# Patient Record
Sex: FEMALE | Race: BLACK OR AFRICAN AMERICAN | NOT HISPANIC OR LATINO | Employment: FULL TIME
[De-identification: names, ages, dates, MRNs, and addresses within clinical notes are randomized per-mention and may not be internally consistent; named-entity substitution may affect disease eponyms.]

---

## 2019-06-06 ENCOUNTER — RECORDS - HEALTHEAST (OUTPATIENT)
Dept: ADMINISTRATIVE | Facility: OTHER | Age: 29
End: 2019-06-06

## 2019-10-25 ENCOUNTER — OFFICE VISIT (OUTPATIENT)
Dept: URGENT CARE | Facility: URGENT CARE | Age: 29
End: 2019-10-25
Payer: COMMERCIAL

## 2019-10-25 VITALS
DIASTOLIC BLOOD PRESSURE: 70 MMHG | BODY MASS INDEX: 30.11 KG/M2 | SYSTOLIC BLOOD PRESSURE: 104 MMHG | HEART RATE: 80 BPM | WEIGHT: 176.37 LBS | HEIGHT: 64 IN | TEMPERATURE: 98 F

## 2019-10-25 DIAGNOSIS — B36.9 FUNGAL DERMATITIS: Primary | ICD-10-CM

## 2019-10-25 PROCEDURE — 99203 OFFICE O/P NEW LOW 30 MIN: CPT | Performed by: FAMILY MEDICINE

## 2019-10-25 RX ORDER — KETOCONAZOLE 20 MG/G
CREAM TOPICAL DAILY
Qty: 60 G | Refills: 1 | Status: SHIPPED | OUTPATIENT
Start: 2019-10-25 | End: 2023-10-27

## 2019-10-25 ASSESSMENT — MIFFLIN-ST. JEOR: SCORE: 1500

## 2019-10-26 NOTE — PROGRESS NOTES
Subjective: Patient has had a rash off and on for a month or so and apparently some family members have it as well.  It is itchy.    Objective: There are multiple round lesions on her face and arms with rough skin consistent with fungus.    Assessment and plan: Fungal dermatitis.  Will treat with Nizoral cream, needs to be diligent as this could take 3 or 4 weeks.    Addendum: She has been having a problem for a long time with burning in her eyes.  I thought may be a dermatologist could help sort out whether these are connected with her present rash and I referred her.

## 2020-03-08 ENCOUNTER — OFFICE VISIT (OUTPATIENT)
Dept: URGENT CARE | Facility: URGENT CARE | Age: 30
End: 2020-03-08
Payer: COMMERCIAL

## 2020-03-08 VITALS
SYSTOLIC BLOOD PRESSURE: 104 MMHG | OXYGEN SATURATION: 100 % | TEMPERATURE: 98.6 F | WEIGHT: 181 LBS | DIASTOLIC BLOOD PRESSURE: 74 MMHG | HEART RATE: 88 BPM | BODY MASS INDEX: 31.07 KG/M2

## 2020-03-08 DIAGNOSIS — J06.9 UPPER RESPIRATORY TRACT INFECTION, UNSPECIFIED TYPE: Primary | ICD-10-CM

## 2020-03-08 DIAGNOSIS — R07.0 THROAT PAIN: ICD-10-CM

## 2020-03-08 PROCEDURE — 87651 STREP A DNA AMP PROBE: CPT | Performed by: PHYSICIAN ASSISTANT

## 2020-03-08 PROCEDURE — 40001204 ZZHCL STATISTIC STREP A RAPID: Performed by: PHYSICIAN ASSISTANT

## 2020-03-08 PROCEDURE — 99213 OFFICE O/P EST LOW 20 MIN: CPT | Performed by: FAMILY MEDICINE

## 2020-03-08 NOTE — PATIENT INSTRUCTIONS
Patient Education     When You Have a Sore Throat    A sore throat can be painful. There are many reasons why you may have a sore throat. Your healthcare provider will work with you to find the cause of your sore throat. He or she will also find the best treatment for you.  What causes a sore throat?  Sore throats can be caused or worsened by:    Cold or flu viruses    Bacteria    Irritants such as tobacco smoke or air pollution    Acid reflux  A healthy throat  The tonsils are on the sides of the throat near the base of the tongue. They collect viruses and bacteria and help fight infection. The throat (pharynx) is the passage for air. Mucus from the nasal cavity also moves down the passage.  An inflamed throat  The tonsils and pharynx can become inflamed due to a cold or flu virus. Postnasal drip (excess mucus draining from the nasal cavity) can irritate the throat. It can also make the throat or tonsils more likely to be infected by bacteria. Severe, untreated tonsillitis in children or adults can cause a pocket of pus (abscess) to form near the tonsil.  Your evaluation  A medical evaluation can help find the cause of your sore throat. It can also help your healthcare provider choose the best treatment for you. The evaluation may include a health history, physical exam, and diagnostic tests.  Health history  Your healthcare provider may ask you the following:    How long has the sore throat lasted and how have you been treating it?    Do you have any other symptoms, such as body aches, fever, or cough?    Does your sore throat recur? If so, how often? How many days of school or work have you missed because of a sore throat?    Do you have trouble eating or swallowing?    Have you been told that you snore or have other sleep problems?    Do you have bad breath?    Do you cough up bad-tasting mucus?  Physical exam  During the exam, your healthcare provider checks your ears, nose, and throat for problems. He or she  "also checks for swelling in the neck, and may listen to your chest.  Possible tests  Other tests your healthcare provider may perform include:    A throat swab to check for bacteria such as streptococcus (the bacteria that causes strep throat)    A blood test to check for mononucleosis (a viral infection)    A chest X-ray to rule out pneumonia, especially if you have a cough  Treating a sore throat  Treatment depends on many factors. What is the likely cause? Is the problem recent? Does it keep coming back? In many cases, the best thing to do is to treat the symptoms, rest, and let the problem heal itself. Antibiotics may help clear up some bacterial infections. For cases of severe or recurring tonsillitis, the tonsils may need to be removed.  Relieving your symptoms    Don t smoke, and avoid secondhand smoke.    For children, try throat sprays or Popsicles. Adults and older children may try lozenges.    Drink warm liquids to soothe the throat and help thin mucus. Avoid alcohol, spicy foods, and acidic drinks such as orange juice. These can irritate the throat.    Gargle with warm saltwater (1 teaspoon of salt to 8 ounces of warm water).    Use a humidifier to keep air moist and relieve throat dryness.    Try over-the-counter pain relievers such as acetaminophen or ibuprofen. Use as directed, and don t exceed the recommended dose. Don t give aspirin to children.   Are antibiotics needed?  If your sore throat is due to a bacterial infection, antibiotics may speed healing and prevent complications. Although group A streptococcus (\"strep throat\" or GAS) is the major treatable infection for a sore throat, GAS causes only 5% to 15% of sore throats in adults who seek medical care. Most sore throats are caused by cold or flu viruses. And antibiotics don t treat viral illness. In fact, using antibiotics when they re not needed may produce bacteria that are harder to kill. Your healthcare provider will prescribe antibiotics " only if he or she thinks they are likely to help.  If antibiotics are prescribed  Take the medicine exactly as directed. Be sure to finish your prescription even if you re feeling better. And be sure to ask your healthcare provider or pharmacist what side effects are common and what to do about them.  Is surgery needed?  In some cases, tonsils need to be removed. This is often done as outpatient (same-day) surgery. Your healthcare provider may advise removing the tonsils in cases of:    Several severe bouts of tonsillitis in a year.  Severe  episodes include those that lead to missed days of school or work, or that need to be treated with antibiotics.    Tonsillitis that causes breathing problems during sleep    Tonsillitis caused by food particles collecting in pouches in the tonsils (cryptic tonsillitis)  Call your healthcare provider if any of the following occur:    Symptoms worsen, or new symptoms develop.    Swollen tonsils make breathing difficult.    The pain is severe enough to keep you from drinking liquids.    A skin rash, hives, or wheezing develops. Any of these could signal an allergic reaction to antibiotics.    Symptoms don t improve within a week.    Symptoms don t improve within 2 to 3 days of starting antibiotics.   Date Last Reviewed: 10/1/2016    3172-7586 [employer]. [address] [place] [address], [place] [address]. All rights reserved. This information is not intended as a substitute for professional medical care. Always follow your healthcare professional's instructions.

## 2020-03-08 NOTE — PROGRESS NOTES
Subjective     Phylicia Rueda is a 30 year old female who presents to clinic today for the following health issues:    HPI   RESPIRATORY SYMPTOMS      Duration: yesterday     Description  sore throat and fatigue/malaise    Severity: moderate    Accompanying signs and symptoms: None    History (predisposing factors):  none    Precipitating or alleviating factors: None    Therapies tried and outcome:  ibuprofen         There is no problem list on file for this patient.    No past surgical history on file.    Social History     Tobacco Use     Smoking status: Never Smoker     Smokeless tobacco: Never Used   Substance Use Topics     Alcohol use: Not on file     No family history on file.      Current Outpatient Medications   Medication Sig Dispense Refill     ketoconazole (NIZORAL) 2 % external cream Apply topically daily (Patient not taking: Reported on 3/8/2020) 60 g 1     No Known Allergies  No lab results found.   BP Readings from Last 3 Encounters:   03/08/20 104/74   10/25/19 104/70    Wt Readings from Last 3 Encounters:   03/08/20 82.1 kg (181 lb)   10/25/19 80 kg (176 lb 5.9 oz)                 Reviewed and updated as needed this visit by Provider         Review of Systems   ROS COMP: Constitutional, HEENT, cardiovascular, pulmonary, gi and gu systems are negative, except as otherwise noted.      Objective    /74   Pulse 88   Temp 98.6  F (37  C) (Oral)   Wt 82.1 kg (181 lb)   SpO2 100%   BMI 31.07 kg/m    Body mass index is 31.07 kg/m .  Physical Exam   GENERAL: alert and no distress  EYES: Eyes grossly normal to inspection, PERRL and conjunctivae and sclerae normal  HENT: normal cephalic/atraumatic, ear canals and TM's normal, nose and mouth without ulcers or lesions, oropharynx clear and oral mucous membranes moist  NECK: no adenopathy, no asymmetry, masses, or scars and thyroid normal to palpation  RESP: lungs clear to auscultation - no rales, rhonchi or wheezes  CV: regular rate and rhythm,  normal S1 S2, no S3 or S4, no murmur, click or rub, no peripheral edema and peripheral pulses strong  ABDOMEN: soft, nontender, no hepatosplenomegaly, no masses and bowel sounds normal  MS: no gross musculoskeletal defects noted, no edema    Results for orders placed or performed in visit on 03/08/20   Streptococcus A Rapid Scr w Reflx to PCR     Status: None    Specimen: Throat   Result Value Ref Range    Strep Specimen Description Throat     Streptococcus Group A Rapid Screen Negative NEG^Negative       Assessment & Plan     (J06.9) Upper respiratory tract infection, unspecified type  (primary encounter diagnosis)  Comment: Discussed in detail differentials and further management. Symptoms are likely secondary to viral infection. Recommended well hydration, over-the-counter analgesia, warm fluids and saline gargles. Follow up if symptoms persist or worsen. Written instructions/information provided. Patient understood and in agreement with the above plan. All questions are answered      (R07.0) Throat pain  Comment:   Plan: Streptococcus A Rapid Scr w Reflx to PCR, Group        A Streptococcus PCR Throat Swab                Patient Instructions       Patient Education     When You Have a Sore Throat    A sore throat can be painful. There are many reasons why you may have a sore throat. Your healthcare provider will work with you to find the cause of your sore throat. He or she will also find the best treatment for you.  What causes a sore throat?  Sore throats can be caused or worsened by:    Cold or flu viruses    Bacteria    Irritants such as tobacco smoke or air pollution    Acid reflux  A healthy throat  The tonsils are on the sides of the throat near the base of the tongue. They collect viruses and bacteria and help fight infection. The throat (pharynx) is the passage for air. Mucus from the nasal cavity also moves down the passage.  An inflamed throat  The tonsils and pharynx can become inflamed due to a cold  or flu virus. Postnasal drip (excess mucus draining from the nasal cavity) can irritate the throat. It can also make the throat or tonsils more likely to be infected by bacteria. Severe, untreated tonsillitis in children or adults can cause a pocket of pus (abscess) to form near the tonsil.  Your evaluation  A medical evaluation can help find the cause of your sore throat. It can also help your healthcare provider choose the best treatment for you. The evaluation may include a health history, physical exam, and diagnostic tests.  Health history  Your healthcare provider may ask you the following:    How long has the sore throat lasted and how have you been treating it?    Do you have any other symptoms, such as body aches, fever, or cough?    Does your sore throat recur? If so, how often? How many days of school or work have you missed because of a sore throat?    Do you have trouble eating or swallowing?    Have you been told that you snore or have other sleep problems?    Do you have bad breath?    Do you cough up bad-tasting mucus?  Physical exam  During the exam, your healthcare provider checks your ears, nose, and throat for problems. He or she also checks for swelling in the neck, and may listen to your chest.  Possible tests  Other tests your healthcare provider may perform include:    A throat swab to check for bacteria such as streptococcus (the bacteria that causes strep throat)    A blood test to check for mononucleosis (a viral infection)    A chest X-ray to rule out pneumonia, especially if you have a cough  Treating a sore throat  Treatment depends on many factors. What is the likely cause? Is the problem recent? Does it keep coming back? In many cases, the best thing to do is to treat the symptoms, rest, and let the problem heal itself. Antibiotics may help clear up some bacterial infections. For cases of severe or recurring tonsillitis, the tonsils may need to be removed.  Relieving your  "symptoms    Don t smoke, and avoid secondhand smoke.    For children, try throat sprays or Popsicles. Adults and older children may try lozenges.    Drink warm liquids to soothe the throat and help thin mucus. Avoid alcohol, spicy foods, and acidic drinks such as orange juice. These can irritate the throat.    Gargle with warm saltwater (1 teaspoon of salt to 8 ounces of warm water).    Use a humidifier to keep air moist and relieve throat dryness.    Try over-the-counter pain relievers such as acetaminophen or ibuprofen. Use as directed, and don t exceed the recommended dose. Don t give aspirin to children.   Are antibiotics needed?  If your sore throat is due to a bacterial infection, antibiotics may speed healing and prevent complications. Although group A streptococcus (\"strep throat\" or GAS) is the major treatable infection for a sore throat, GAS causes only 5% to 15% of sore throats in adults who seek medical care. Most sore throats are caused by cold or flu viruses. And antibiotics don t treat viral illness. In fact, using antibiotics when they re not needed may produce bacteria that are harder to kill. Your healthcare provider will prescribe antibiotics only if he or she thinks they are likely to help.  If antibiotics are prescribed  Take the medicine exactly as directed. Be sure to finish your prescription even if you re feeling better. And be sure to ask your healthcare provider or pharmacist what side effects are common and what to do about them.  Is surgery needed?  In some cases, tonsils need to be removed. This is often done as outpatient (same-day) surgery. Your healthcare provider may advise removing the tonsils in cases of:    Several severe bouts of tonsillitis in a year.  Severe  episodes include those that lead to missed days of school or work, or that need to be treated with antibiotics.    Tonsillitis that causes breathing problems during sleep    Tonsillitis caused by food particles collecting " in pouches in the tonsils (cryptic tonsillitis)  Call your healthcare provider if any of the following occur:    Symptoms worsen, or new symptoms develop.    Swollen tonsils make breathing difficult.    The pain is severe enough to keep you from drinking liquids.    A skin rash, hives, or wheezing develops. Any of these could signal an allergic reaction to antibiotics.    Symptoms don t improve within a week.    Symptoms don t improve within 2 to 3 days of starting antibiotics.   Date Last Reviewed: 10/1/2016    0557-5349 [employer]. [address] [place], [place] [id number]. All rights reserved. This information is not intended as a substitute for professional medical care. Always follow your healthcare professional's instructions.               Piter Serna MD  [facility]

## 2020-03-09 LAB
DEPRECATED S PYO AG THROAT QL EIA: NEGATIVE
SPECIMEN SOURCE: NORMAL
SPECIMEN SOURCE: NORMAL
STREP GROUP A PCR: NOT DETECTED

## 2020-12-28 ENCOUNTER — OFFICE VISIT (OUTPATIENT)
Dept: URGENT CARE | Facility: URGENT CARE | Age: 30
End: 2020-12-28
Payer: COMMERCIAL

## 2020-12-28 VITALS
TEMPERATURE: 98.1 F | SYSTOLIC BLOOD PRESSURE: 102 MMHG | HEART RATE: 76 BPM | WEIGHT: 180 LBS | OXYGEN SATURATION: 97 % | BODY MASS INDEX: 30.73 KG/M2 | DIASTOLIC BLOOD PRESSURE: 70 MMHG | HEIGHT: 64 IN

## 2020-12-28 DIAGNOSIS — N94.89 VAGINAL BURNING: ICD-10-CM

## 2020-12-28 DIAGNOSIS — B37.2 CANDIDIASIS OF SKIN: Primary | ICD-10-CM

## 2020-12-28 LAB
ALBUMIN UR-MCNC: NEGATIVE MG/DL
APPEARANCE UR: CLEAR
BILIRUB UR QL STRIP: NEGATIVE
COLOR UR AUTO: YELLOW
GLUCOSE UR STRIP-MCNC: NEGATIVE MG/DL
HGB UR QL STRIP: ABNORMAL
KETONES UR STRIP-MCNC: NEGATIVE MG/DL
LEUKOCYTE ESTERASE UR QL STRIP: ABNORMAL
NITRATE UR QL: NEGATIVE
PH UR STRIP: 6 PH (ref 5–7)
RBC #/AREA URNS AUTO: NORMAL /HPF
SOURCE: ABNORMAL
SP GR UR STRIP: 1.02 (ref 1–1.03)
SPECIMEN SOURCE: NORMAL
UROBILINOGEN UR STRIP-ACNC: 0.2 EU/DL (ref 0.2–1)
WBC #/AREA URNS AUTO: NORMAL /HPF
WET PREP SPEC: NORMAL

## 2020-12-28 PROCEDURE — 99213 OFFICE O/P EST LOW 20 MIN: CPT | Performed by: NURSE PRACTITIONER

## 2020-12-28 PROCEDURE — 81001 URINALYSIS AUTO W/SCOPE: CPT | Performed by: NURSE PRACTITIONER

## 2020-12-28 PROCEDURE — 87210 SMEAR WET MOUNT SALINE/INK: CPT | Performed by: NURSE PRACTITIONER

## 2020-12-28 RX ORDER — MICONAZOLE NITRATE 20 MG/G
CREAM TOPICAL 2 TIMES DAILY
Qty: 56.7 G | Refills: 0 | Status: SHIPPED | OUTPATIENT
Start: 2020-12-28 | End: 2021-01-04

## 2020-12-28 ASSESSMENT — MIFFLIN-ST. JEOR: SCORE: 1511.47

## 2020-12-29 NOTE — PATIENT INSTRUCTIONS
Patient Education     Candida Skin Infection (Adult)   Candida is a type of yeast. It grows naturally on the skin and in the mouth. If it grows out of control, it can cause an infection. Candida can cause infections in the genital area, skin folds, in the mouth, and under the breasts. Anyone can get this infection. It is more common in a person with a weak immune system, such as from diabetes, HIV, or cancer. It s also more common in someone who has been on antibiotic therapy. And it s more common in people who are overweight or who have incontinence. Wearing tight-fitting clothing and taking part in activities with lots of skin-to-skin contact can also put you at risk.  Candida causes the skin to become bright red and inflamed. The border of the infected part of the skin is often raised. The infection causes pain and itching. Sometimes the skin peels and bleeds. In the mouth, candida is called thrush, and may cause white thickened areas.  A Candida rash is most often treated with an antifungal cream, gel, or powder. . The rash will clear a few days after starting the medicine. Infections that don t go away may need a prescription medicine. In rare cases, a bacterial infection can also occur.  Home care  Your healthcare provider will advise using an antifungal cream, powder, or gel for the rash. He or she may also prescribe a medicine for the itch. Follow all instructions for using these medicines.  General care    Keep your skin clean by washing the area twice a day.    Use the medicine as directed until your rash is gone. Once the skin has healed, keep it dry to prevent another infection.     If you are overweight, talk with your healthcare provider about a plan to lose extra weight.    Don't wear tight-fitting clothes.    Follow-up care  Follow up with your healthcare provider, or as advised. Your rash will clear in 7 to 14 days. Call your provider if the rash is not gone after 14 days.  When to get medical  advice  Call your healthcare provider right away if any of these occur:    Pain or redness that gets worse or spreads    Fluid coming from the skin    Yellow crusts on the skin    Fever of 100.4 F (38 C) or higher, or as directed by your provider  Alice last reviewed this educational content on 10/1/2019    7271-4364 [employer]. [address] [place] [address]. All rights reserved. This information is not intended as a substitute for professional medical care. Always follow your healthcare professional's instructions.

## 2020-12-29 NOTE — PROGRESS NOTES
"SUBJECTIVE:   Phylicia Rueda is a 30 year old female presenting with a chief complaint of vaginal itching for approximately 1 week.  She denies any vaginal discharge, new sexual partners, or dysuria.  Patient is at the end of her menses.    Onset of symptoms was 1 week(s) ago.  Course of illness is same.    Severity moderate  Previous Treatment hot water to wash area      History reviewed. No pertinent past medical history.  Current Outpatient Medications   Medication Sig Dispense Refill     miconazole (MICATIN) 2 % external cream Apply topically 2 times daily for 7 days 56.7 g 0     omeprazole (PRILOSEC) 20 MG DR capsule Take 1 cap twice daily; take 30 minutes before breakfast and evening meal.  Do not crush or chew.       ketoconazole (NIZORAL) 2 % external cream Apply topically daily (Patient not taking: Reported on 3/8/2020) 60 g 1     Social History     Tobacco Use     Smoking status: Never Smoker     Smokeless tobacco: Never Used   Substance Use Topics     Alcohol use: Not on file     History reviewed. No pertinent family history.     ROS: 10 point ROS neg other than the symptoms noted above in the HPI.     OBJECTIVE  /70   Pulse 76   Temp 98.1  F (36.7  C) (Oral)   Ht 1.626 m (5' 4\")   Wt 81.6 kg (180 lb)   LMP 12/28/2020   SpO2 97%   BMI 30.90 kg/m        GENERAL APPEARANCE: healthy appearing, alert     ABDOMEN:  soft, nontender, no HSM or masses and bowel sounds normal     GU_female: Normal labia minora and majora, clitoris has been removed   MS: extremities normal- no gross deformities noted; normal muscle tone.     SKIN: , red slightly raised rash noted in the inner thighs adjacent to the labia,        Labs:  Results for orders placed or performed in visit on 12/28/20 (from the past 24 hour(s))   *UA reflex to Microscopic and Culture ([place] and [facility] (except [place] and [place])    Specimen: Midstream Urine   Result Value Ref Range    Color Urine Yellow     Appearance " Urine Clear     Glucose Urine Negative NEG^Negative mg/dL    Bilirubin Urine Negative NEG^Negative    Ketones Urine Negative NEG^Negative mg/dL    Specific Gravity Urine 1.025 1.003 - 1.035    Blood Urine Large (A) NEG^Negative    pH Urine 6.0 5.0 - 7.0 pH    Protein Albumin Urine Negative NEG^Negative mg/dL    Urobilinogen Urine 0.2 0.2 - 1.0 EU/dL    Nitrite Urine Negative NEG^Negative    Leukocyte Esterase Urine Small (A) NEG^Negative    Source Midstream Urine    Wet prep    Specimen: Vagina   Result Value Ref Range    Specimen Description Vagina     Wet Prep Rare  WBC'S seen       Wet Prep No clue cells seen     Wet Prep No yeast seen     Wet Prep No Trichomonas seen    Urine Microscopic   Result Value Ref Range    WBC Urine 0 - 5 OTO5^0 - 5 /HPF    RBC Urine O - 2 OTO2^O - 2 /HPF         ASSESSMENT/PLAN:      ICD-10-CM    1. Candidiasis of skin  B37.2 miconazole (MICATIN) 2 % external cream   2. Vaginal burning  N94.9 *UA reflex to Microscopic and Culture ([place] and [facility] (except [place] and [place])     Wet prep     Urine Microscopic        Follow Up:      Patient Instructions     Patient Education     Candida Skin Infection (Adult)   Candida is a type of yeast. It grows naturally on the skin and in the mouth. If it grows out of control, it can cause an infection. Candida can cause infections in the genital area, skin folds, in the mouth, and under the breasts. Anyone can get this infection. It is more common in a person with a weak immune system, such as from diabetes, HIV, or cancer. It s also more common in someone who has been on antibiotic therapy. And it s more common in people who are overweight or who have incontinence. Wearing tight-fitting clothing and taking part in activities with lots of skin-to-skin contact can also put you at risk.  Candida causes the skin to become bright red and inflamed. The border of the infected part of the skin is often raised. The infection causes pain and  itching. Sometimes the skin peels and bleeds. In the mouth, candida is called thrush, and may cause white thickened areas.  A Candida rash is most often treated with an antifungal cream, gel, or powder. . The rash will clear a few days after starting the medicine. Infections that don t go away may need a prescription medicine. In rare cases, a bacterial infection can also occur.  Home care  Your healthcare provider will advise using an antifungal cream, powder, or gel for the rash. He or she may also prescribe a medicine for the itch. Follow all instructions for using these medicines.  General care    Keep your skin clean by washing the area twice a day.    Use the medicine as directed until your rash is gone. Once the skin has healed, keep it dry to prevent another infection.     If you are overweight, talk with your healthcare provider about a plan to lose extra weight.    Don't wear tight-fitting clothes.    Follow-up care  Follow up with your healthcare provider, or as advised. Your rash will clear in 7 to 14 days. Call your provider if the rash is not gone after 14 days.  When to get medical advice  Call your healthcare provider right away if any of these occur:    Pain or redness that gets worse or spreads    Fluid coming from the skin    Yellow crusts on the skin    Fever of 100.4 F (38 C) or higher, or as directed by your provider  [employer] last reviewed this educational content on 10/1/2019    4489-3657 [employer]. [address] [place] [address]. All rights reserved. This information is not intended as a substitute for professional medical care. Always follow your healthcare professional's instructions.               ELENA Peña, CNP  [facility] Provider

## 2022-04-20 ENCOUNTER — OFFICE VISIT (OUTPATIENT)
Dept: URGENT CARE | Facility: URGENT CARE | Age: 32
End: 2022-04-20
Payer: COMMERCIAL

## 2022-04-20 VITALS
DIASTOLIC BLOOD PRESSURE: 71 MMHG | WEIGHT: 180 LBS | HEIGHT: 65 IN | TEMPERATURE: 97.8 F | BODY MASS INDEX: 29.99 KG/M2 | OXYGEN SATURATION: 99 % | HEART RATE: 68 BPM | SYSTOLIC BLOOD PRESSURE: 106 MMHG | RESPIRATION RATE: 20 BRPM

## 2022-04-20 DIAGNOSIS — N92.1 PROLONGED MENSTRUATION: ICD-10-CM

## 2022-04-20 DIAGNOSIS — N92.1 MENOMETRORRHAGIA: Primary | ICD-10-CM

## 2022-04-20 DIAGNOSIS — R51.9 NONINTRACTABLE HEADACHE, UNSPECIFIED CHRONICITY PATTERN, UNSPECIFIED HEADACHE TYPE: ICD-10-CM

## 2022-04-20 DIAGNOSIS — G89.29 CHRONIC PAIN OF LEFT KNEE: ICD-10-CM

## 2022-04-20 DIAGNOSIS — R12 HEARTBURN: ICD-10-CM

## 2022-04-20 DIAGNOSIS — M25.562 CHRONIC PAIN OF LEFT KNEE: ICD-10-CM

## 2022-04-20 LAB
ERYTHROCYTE [DISTWIDTH] IN BLOOD BY AUTOMATED COUNT: 12.5 % (ref 10–15)
HCT VFR BLD AUTO: 38.9 % (ref 35–47)
HGB BLD-MCNC: 13.1 G/DL (ref 11.7–15.7)
MCH RBC QN AUTO: 28.9 PG (ref 26.5–33)
MCHC RBC AUTO-ENTMCNC: 33.7 G/DL (ref 31.5–36.5)
MCV RBC AUTO: 86 FL (ref 78–100)
PLATELET # BLD AUTO: 268 10E3/UL (ref 150–450)
RBC # BLD AUTO: 4.54 10E6/UL (ref 3.8–5.2)
WBC # BLD AUTO: 7 10E3/UL (ref 4–11)

## 2022-04-20 PROCEDURE — 84443 ASSAY THYROID STIM HORMONE: CPT | Performed by: INTERNAL MEDICINE

## 2022-04-20 PROCEDURE — 99214 OFFICE O/P EST MOD 30 MIN: CPT | Performed by: INTERNAL MEDICINE

## 2022-04-20 PROCEDURE — 36415 COLL VENOUS BLD VENIPUNCTURE: CPT | Performed by: INTERNAL MEDICINE

## 2022-04-20 PROCEDURE — 85027 COMPLETE CBC AUTOMATED: CPT | Performed by: INTERNAL MEDICINE

## 2022-04-20 RX ORDER — NAPROXEN 500 MG/1
500 TABLET ORAL 2 TIMES DAILY WITH MEALS
Qty: 28 TABLET | Refills: 0 | Status: SHIPPED | OUTPATIENT
Start: 2022-04-20 | End: 2022-05-04

## 2022-04-20 RX ORDER — OMEPRAZOLE 40 MG/1
40 CAPSULE, DELAYED RELEASE ORAL DAILY
Qty: 30 CAPSULE | Refills: 0 | Status: SHIPPED | OUTPATIENT
Start: 2022-04-20

## 2022-04-20 NOTE — PATIENT INSTRUCTIONS
Follow up with gynecology for menstrual cycles    Screen anemia with blood count  Screen TSH thyroid test for irregular menses.    Naprosyn 2 x day as needed for knee & menstrual cycle with food as can bother stomach    Knee sleeve for knee pain      Omeprazole 40 mg daily for heart burn    Recheck with primary clinic for knee pain & heartburn    Avoid spicy food & tea.

## 2022-04-20 NOTE — PROGRESS NOTES
ASSESSMENT AND PLAN:      ICD-10-CM    1. Menometrorrhagia  N92.1 CBC with platelets     TSH with free T4 reflex     naproxen (NAPROSYN) 500 MG tablet     CBC with platelets     TSH with free T4 reflex   2. Prolonged menstruation  N92.1 CBC with platelets     TSH with free T4 reflex     CBC with platelets     TSH with free T4 reflex   3. Nonintractable headache, unspecified chronicity pattern, unspecified headache type  R51.9 CBC with platelets     TSH with free T4 reflex     CBC with platelets     TSH with free T4 reflex   4. Chronic pain of left knee  M25.562 Knee Supplies Order for DME - ONLY FOR DME    G89.29 naproxen (NAPROSYN) 500 MG tablet   5. Heartburn  R12 omeprazole (PRILOSEC) 40 MG DR capsule       Also requests heartburn medication.  Likes spicy food.  Drinks tea  Discussed naproxen may irritate her stomach    Questions and concerns addressed through [facility] phone .      Patient Instructions       Follow up with gynecology for menstrual cycles    Screen anemia with blood count  Screen TSH thyroid test for irregular menses.    Naprosyn 2 x day as needed for knee & menstrual cycle with food as can bother stomach    Knee sleeve for knee pain      Omeprazole 40 mg daily for heart burn    Recheck with primary clinic for knee pain & heartburn    Avoid spicy food & tea.    Return in about 1 week (around 4/27/2022).    Addendum called and informed normal blood count.  No anemia      Lily Mays MD  [facility] URGENT [facility]ARE    Subjective     Phylicia Denny Rueda is a 32 year old who presents for Patient presents with:  Knee Pain: pain under knee, c/o ligament pain.   Abnormal Bleeding Problem: Menstrual period Bleeding x1 month and still not stopping.    Headache: Since x1 month.  Urgent Care    [facility] phone      an established patient of [facility].    MS Injury/Pain  Has had history of left knee pain  That started bothering her again yesterday.  Hurts behind back of  "knee    GYN Complaint    Onset of symptoms was 1 month(s) ago.  Current and Associated symptoms: menstrual cycle for past month  Has difficulty with menses for 5 years  Pain lower abdomen  Has implant in arm from GYN  Was not able to get into gynecology so came into urgent care today      WHITE BLOOD COUNT         4.5 - 11 thou/cu mm 7.7    RED BLOOD COUNT           4 - 5.2 mil/cu mm 4.58    HEMOGLOBIN                12 - 16 g/dL 13.4    HEMATOCRIT                33 - 51 % 39.3    MCV                       80 - 100 fL 86    MCH                       26 - 34 pg 29.3    MCHC                      32 - 36 g/dL 34.1    RDW                       11.5 - 15.5 % 13.1    PLATELET COUNT            140 - 440 thou/cu mm 282    MPV                       6.5 - 11 fL 10.0            Objective    /71   Pulse 68   Temp 97.8  F (36.6  C) (Temporal)   Resp 20   Ht 1.651 m (5' 5\")   Wt 81.6 kg (180 lb)   SpO2 99%   BMI 29.95 kg/m    Physical Exam  Vitals reviewed.   Constitutional:       Appearance: Normal appearance.   Musculoskeletal:      Comments: Examination of left knee.  Wearing leggings  Knees appear bilateral symmetrical through leggings  Pain reproduced over medial ligament containing posterior popliteal fossa   Neurological:      Mental Status: She is alert.            Results for orders placed or performed in visit on 04/20/22 (from the past 24 hour(s))   CBC with platelets   Result Value Ref Range    WBC Count 7.0 4.0 - 11.0 10e3/uL    RBC Count 4.54 3.80 - 5.20 10e6/uL    Hemoglobin 13.1 11.7 - 15.7 g/dL    Hematocrit 38.9 35.0 - 47.0 %    MCV 86 78 - 100 fL    MCH 28.9 26.5 - 33.0 pg    MCHC 33.7 31.5 - 36.5 g/dL    RDW 12.5 10.0 - 15.0 %    Platelet Count 268 150 - 450 10e3/uL         "

## 2022-04-21 LAB — TSH SERPL DL<=0.005 MIU/L-ACNC: 1.11 MU/L (ref 0.4–4)

## 2022-05-02 DIAGNOSIS — G89.29 CHRONIC PAIN OF LEFT KNEE: ICD-10-CM

## 2022-05-02 DIAGNOSIS — N92.1 MENOMETRORRHAGIA: ICD-10-CM

## 2022-05-02 DIAGNOSIS — M25.562 CHRONIC PAIN OF LEFT KNEE: ICD-10-CM

## 2022-05-03 RX ORDER — NAPROXEN 500 MG/1
TABLET ORAL
Qty: 28 TABLET | Refills: 0 | OUTPATIENT
Start: 2022-05-03

## 2023-10-27 ENCOUNTER — OFFICE VISIT (OUTPATIENT)
Dept: URGENT CARE | Facility: URGENT CARE | Age: 33
End: 2023-10-27
Payer: COMMERCIAL

## 2023-10-27 VITALS
SYSTOLIC BLOOD PRESSURE: 107 MMHG | WEIGHT: 192 LBS | OXYGEN SATURATION: 96 % | BODY MASS INDEX: 31.95 KG/M2 | DIASTOLIC BLOOD PRESSURE: 75 MMHG | RESPIRATION RATE: 20 BRPM | TEMPERATURE: 97.2 F | HEART RATE: 70 BPM

## 2023-10-27 DIAGNOSIS — R30.0 DYSURIA: Primary | ICD-10-CM

## 2023-10-27 DIAGNOSIS — R10.84 ABDOMINAL PAIN, GENERALIZED: ICD-10-CM

## 2023-10-27 PROBLEM — E55.9 VITAMIN D DEFICIENCY: Status: ACTIVE | Noted: 2017-12-12

## 2023-10-27 PROBLEM — D64.9 ANEMIA: Status: ACTIVE | Noted: 2017-04-26

## 2023-10-27 PROBLEM — A04.8 HELICOBACTER PYLORI INFECTION: Status: ACTIVE | Noted: 2018-09-06

## 2023-10-27 PROBLEM — Z97.5 NEXPLANON IN PLACE: Status: ACTIVE | Noted: 2017-05-14

## 2023-10-27 PROBLEM — M79.605 PAIN OF LEFT LOWER EXTREMITY: Status: ACTIVE | Noted: 2019-12-28

## 2023-10-27 PROBLEM — R87.610 ASCUS OF CERVIX WITH NEGATIVE HIGH RISK HPV: Status: ACTIVE | Noted: 2020-01-17

## 2023-10-27 PROBLEM — L98.9 SKIN PROBLEM: Status: ACTIVE | Noted: 2019-12-28

## 2023-10-27 PROBLEM — R10.9 ABDOMINAL PAIN: Status: ACTIVE | Noted: 2019-12-28

## 2023-10-27 LAB
ALBUMIN UR-MCNC: NEGATIVE MG/DL
APPEARANCE UR: CLEAR
BILIRUB UR QL STRIP: NEGATIVE
CLUE CELLS: ABNORMAL
COLOR UR AUTO: YELLOW
GLUCOSE UR STRIP-MCNC: NEGATIVE MG/DL
HGB UR QL STRIP: NEGATIVE
KETONES UR STRIP-MCNC: NEGATIVE MG/DL
LEUKOCYTE ESTERASE UR QL STRIP: NEGATIVE
NITRATE UR QL: NEGATIVE
PH UR STRIP: 7 [PH] (ref 5–7)
SP GR UR STRIP: 1.01 (ref 1–1.03)
TRICHOMONAS, WET PREP: ABNORMAL
UROBILINOGEN UR STRIP-ACNC: 0.2 E.U./DL
WBC'S/HIGH POWER FIELD, WET PREP: ABNORMAL
YEAST, WET PREP: ABNORMAL

## 2023-10-27 PROCEDURE — 81003 URINALYSIS AUTO W/O SCOPE: CPT | Performed by: STUDENT IN AN ORGANIZED HEALTH CARE EDUCATION/TRAINING PROGRAM

## 2023-10-27 PROCEDURE — 99213 OFFICE O/P EST LOW 20 MIN: CPT | Performed by: STUDENT IN AN ORGANIZED HEALTH CARE EDUCATION/TRAINING PROGRAM

## 2023-10-27 PROCEDURE — 87210 SMEAR WET MOUNT SALINE/INK: CPT | Performed by: STUDENT IN AN ORGANIZED HEALTH CARE EDUCATION/TRAINING PROGRAM

## 2023-10-28 NOTE — PATIENT INSTRUCTIONS
I recommend that you establish care with a primary care doctor. I recommend Dr. Noe Perry at [facility]    Your testing was negative today for a urinary tract infection, BV or a yeast infection. Since you have questions about your menses, they will help answer those.

## 2023-10-28 NOTE — PROGRESS NOTES
"Assessment & Plan     Dysuria  UA and wet prep today are negative.  Patient has questions about her menstrual cycle and would like to see an OB/GYN so routine referral placed today.  - UA Macroscopic with reflex to Microscopic and Culture - Clinic Collect  - Wet prep - Clinic Collect  - Ob/Gyn  Referral    Abdominal pain, generalized  > 6mo h/o generalized abdominal pain. Seen in 2023 for similar problem in ED, STI testing negative at that time. CBC and BMP WNL.  Last A1c in  was 5.4. H/o H Pylori so we will recheck today.  Exam consistent with mild suprapubic tenderness to palpation.  In the absence of vital abnormalities, infectious symptoms, or red flag symptoms, no additional work-up indicated.  Recommended that she establish care with PCP for possible nonurgent imaging such as abdominal CT scan or EGD.  PCP names given and patient understanding of the plan  - Helicobacter pylori Antigen Stool       No follow-ups on file.    Doris Swenson, DO  she/her  [facility] URGENT [facility]ARE    Subjective     Phylicia Rueda is a 33 year old female who presents to clinic today for the following health issues:    HPI  Lower Stomach pain, pain travel to [place]. Pain x2 months.  Seen in the emergency department 2023.  Symptoms have continued since then.  Feels like something is \"biting at the bottom of her right leg.  No change in diet, no recent travel, no change in bowel movements.      2. pain while peein week. No hematuria, fever.  Is sexually active with single partner  Vaginal discharge- watery liquid and sometimes milky and smells. Has been treated for yeast infection before and this is different   Last menses  -they are abnormal and she was wondering about this                                                                No past medical history on file.    No Known Allergies  Current Outpatient Medications   Medication    omeprazole (PRILOSEC) 40 MG DR capsule     No " current facility-administered medications for this visit.      Review of Systems  Constitutional, HEENT, cardiovascular, pulmonary, gi and gu systems are negative, except as otherwise noted.      Objective    /75   Pulse 70   Temp 97.2  F (36.2  C) (Temporal)   Resp 20   Wt 87.1 kg (192 lb)   SpO2 96%   BMI 31.95 kg/m      Physical Exam   GENERAL: healthy, alert and no distress  EYES: Eyes grossly normal to inspection, PERRL and conjunctivae and sclerae normal  ABDOMEN: Mild suprapubic tenderness to palpation  BACK: no CVA tenderness, no paralumbar tenderness    Results for orders placed or performed in visit on 10/27/23 (from the past 24 hour(s))   UA Macroscopic with reflex to Microscopic and Culture - Clinic Collect    Specimen: Urine, Midstream   Result Value Ref Range    Color Urine Yellow Colorless, Straw, Light Yellow, Yellow    Appearance Urine Clear Clear    Glucose Urine Negative Negative mg/dL    Bilirubin Urine Negative Negative    Ketones Urine Negative Negative mg/dL    Specific Gravity Urine 1.015 1.003 - 1.035    Blood Urine Negative Negative    pH Urine 7.0 5.0 - 7.0    Protein Albumin Urine Negative Negative mg/dL    Urobilinogen Urine 0.2 0.2, 1.0 E.U./dL    Nitrite Urine Negative Negative    Leukocyte Esterase Urine Negative Negative    Narrative    Microscopic not indicated   Wet prep - Clinic Collect    Specimen: Vagina; Swab   Result Value Ref Range    Trichomonas Absent Absent    Yeast Absent Absent    Clue Cells Absent Absent    WBCs/high power field 1+ (A) None           The use of [employer] dictation services may have been used to construct the content in this note; any grammatical or spelling errors are non-intentional. Please contact the author of this note directly if you are in need of any clarification.

## 2023-12-13 ENCOUNTER — OFFICE VISIT (OUTPATIENT)
Dept: URGENT CARE | Facility: URGENT CARE | Age: 33
End: 2023-12-13
Payer: COMMERCIAL

## 2023-12-13 VITALS
RESPIRATION RATE: 18 BRPM | BODY MASS INDEX: 31.78 KG/M2 | DIASTOLIC BLOOD PRESSURE: 76 MMHG | OXYGEN SATURATION: 100 % | TEMPERATURE: 97.6 F | HEART RATE: 73 BPM | SYSTOLIC BLOOD PRESSURE: 107 MMHG | WEIGHT: 191 LBS

## 2023-12-13 DIAGNOSIS — J02.9 SORE THROAT: Primary | ICD-10-CM

## 2023-12-13 LAB
DEPRECATED S PYO AG THROAT QL EIA: NEGATIVE
GROUP A STREP BY PCR: NOT DETECTED

## 2023-12-13 PROCEDURE — 87651 STREP A DNA AMP PROBE: CPT | Performed by: NURSE PRACTITIONER

## 2023-12-13 PROCEDURE — 99213 OFFICE O/P EST LOW 20 MIN: CPT | Performed by: NURSE PRACTITIONER

## 2023-12-13 RX ORDER — ACETAMINOPHEN 80 MG/1
80 TABLET, CHEWABLE ORAL EVERY 4 HOURS PRN
COMMUNITY

## 2023-12-13 NOTE — PROGRESS NOTES
Chief Complaint   Patient presents with    Urgent Care    Pharyngitis     Yesterday.     Headache     SUBJECTIVE:  Phylicia Rueda is a 33 year old female presenting with sore throat headache starting yesterday.  Symptoms are quite mild.  Children with strep.    No past medical history on file.  acetaminophen (TYLENOL) 80 MG chewable tablet, Take 80 mg by mouth every 4 hours as needed for mild pain or fever  omeprazole (PRILOSEC) 40 MG DR capsule, Take 1 capsule (40 mg) by mouth daily    No current facility-administered medications on file prior to visit.    Social History     Tobacco Use    Smoking status: Never    Smokeless tobacco: Never   Substance Use Topics    Alcohol use: Not on file     No Known Allergies    Review of Systems  All systems negative except for those listed above in HPI.    OBJECTIVE:   /76   Pulse 73   Temp 97.6  F (36.4  C) (Temporal)   Resp 18   Wt 86.6 kg (191 lb)   SpO2 100%   BMI 31.78 kg/m      Physical Exam  Vitals reviewed.   Constitutional:       General: She is not in acute distress.     Appearance: Normal appearance. She is well-developed. She is not ill-appearing, toxic-appearing or diaphoretic.   HENT:      Head: Normocephalic and atraumatic.      Nose: Nose normal.      Mouth/Throat:      Pharynx: No oropharyngeal exudate or posterior oropharyngeal erythema.   Eyes:      Conjunctiva/sclera: Conjunctivae normal.      Pupils: Pupils are equal, round, and reactive to light.   Cardiovascular:      Rate and Rhythm: Normal rate.      Pulses: Normal pulses.   Pulmonary:      Effort: Pulmonary effort is normal. No respiratory distress.      Breath sounds: No stridor.   Musculoskeletal:         General: Normal range of motion.      Cervical back: Normal range of motion and neck supple.   Lymphadenopathy:      Cervical: No cervical adenopathy.   Skin:     General: Skin is warm.      Capillary Refill: Capillary refill takes less than 2 seconds.      Findings: No rash.    Neurological:      General: No focal deficit present.      Mental Status: She is alert and oriented to person, place, and time.   Psychiatric:         Mood and Affect: Mood normal.         Behavior: Behavior normal.       Results for orders placed or performed in visit on 12/13/23   Streptococcus A Rapid Screen w/Reflex to PCR - Clinic Collect     Status: Normal    Specimen: Throat; Swab   Result Value Ref Range    Group A Strep antigen Negative Negative     ASSESSMENT:    ICD-10-CM    1. Sore throat  J02.9 Streptococcus A Rapid Screen w/Reflex to PCR - Clinic Collect        PLAN:     Rapid strep test today is negative.   Your throat culture is pending. Urgent Care will call if positive results to start antibiotics at that time; No call if the culture is negative.  Drink plenty of fluids and rest.  May use salt water gargles- about 8 oz warm water with about 1 teaspoon salt  Sucrets and Cepacol spray are over the counter medications that numb the throat.  Over the counter pain relievers such as tylenol or ibuprofen may be used as needed.   Mucinex is product known to help loosen congestion and thin mucus (generic is guaifenesin)   Delsym 12 hour over the counter works well for cough.  Honey has been shown to be helpful in cough management and is soothing to a sore throat. May add to lemon tea.  Please follow up with primary care provider if not improving, worsening or new symptoms.    Follow up with primary care provider with any problems, questions or concerns or if symptoms worsen or fail to improve. Patient agreed to plan and verbalized understanding.    DEYSI Chappell-BC  [facility]

## 2024-03-12 ENCOUNTER — OFFICE VISIT (OUTPATIENT)
Dept: URGENT CARE | Facility: URGENT CARE | Age: 34
End: 2024-03-12
Payer: COMMERCIAL

## 2024-03-12 ENCOUNTER — ANCILLARY PROCEDURE (OUTPATIENT)
Dept: GENERAL RADIOLOGY | Facility: CLINIC | Age: 34
End: 2024-03-12
Attending: FAMILY MEDICINE
Payer: COMMERCIAL

## 2024-03-12 VITALS
TEMPERATURE: 97.4 F | SYSTOLIC BLOOD PRESSURE: 108 MMHG | HEART RATE: 83 BPM | HEIGHT: 66 IN | BODY MASS INDEX: 28.93 KG/M2 | WEIGHT: 180 LBS | OXYGEN SATURATION: 100 % | DIASTOLIC BLOOD PRESSURE: 82 MMHG

## 2024-03-12 DIAGNOSIS — R10.13 EPIGASTRIC PAIN: ICD-10-CM

## 2024-03-12 DIAGNOSIS — R07.1 CHEST PAIN VARYING WITH BREATHING: Primary | ICD-10-CM

## 2024-03-12 PROCEDURE — 71046 X-RAY EXAM CHEST 2 VIEWS: CPT | Mod: TC | Performed by: RADIOLOGY

## 2024-03-12 PROCEDURE — 99214 OFFICE O/P EST MOD 30 MIN: CPT | Performed by: FAMILY MEDICINE

## 2024-03-12 RX ORDER — OMEPRAZOLE 40 MG/1
40 CAPSULE, DELAYED RELEASE ORAL DAILY
Qty: 30 CAPSULE | Refills: 0 | Status: SHIPPED | OUTPATIENT
Start: 2024-03-12

## 2024-03-12 NOTE — PROGRESS NOTES
Chief Complaint   Patient presents with    Urgent Care    Respiratory Problems     Pt in clinic c/o chest discomfort and back discomfort with deep breathing.       Phylicia was seen today for urgent care and respiratory problems.    Diagnoses and all orders for this visit:    Chest pain varying with breathing  -     XR Chest 2 Views    Epigastric pain  -     XR Chest 2 Views      D/d- pneumonia , costochondritis, gastritis, rib contusion, pneumothorax   ASSESSMENT:      Chest pain varying with breathing  Epigastric pain    PLAN:   See orders in epic.   Symptomatic treat  OTC analgesic as needed. Follow-up with primary clinic if not improving.  Discussed with patient about the normal x-ray finding advised patient to consider doing Tylenol done with the pain.  Also called in omeprazole to help with the reflux symptoms.  Patient is currently fasting so cannot take the Tylenol every 6 hours at nighttime when she breaks her fast         SUBJECTIVE:  Phylicia Rueda is a 34 year old female with a chief complaint of pain in the lower rib area with deep breathing  Onset of symptoms was 2 day(s) ago.    Course of illness: gradual onset.  Severity moderate  Current and Associated symptoms: pain with deep breathing and also there is worsening pain with eating   Treatment measures tried include None tried.  Predisposing factors include None.  Visit was done with the help of   No past medical history on file.  Current Outpatient Medications   Medication Sig Dispense Refill    acetaminophen (TYLENOL) 80 MG chewable tablet Take 80 mg by mouth every 4 hours as needed for mild pain or fever (Patient not taking: Reported on 3/12/2024)      omeprazole (PRILOSEC) 40 MG DR capsule Take 1 capsule (40 mg) by mouth daily (Patient not taking: Reported on 3/12/2024) 30 capsule 0     Social History     Tobacco Use    Smoking status: Never    Smokeless tobacco: Never   Substance Use Topics    Alcohol use: Not on file  "      ROS:  Review of systems negative except as stated above.    OBJECTIVE:   /82   Pulse 83   Temp 97.4  F (36.3  C) (Temporal)   Ht 1.676 m (5' 6\")   Wt 81.6 kg (180 lb)   SpO2 100%   BMI 29.05 kg/m    GENERAL APPEARANCE: healthy, alert and no distress  EYES: EOMI,  PERRL, conjunctiva clear  HENT: ear canals and TM's normal.  Nose normal.  Pharynx no erythema  NECK: supple, non-tender to palpation, no adenopathy noted  RESP: lungs clear to auscultation - no rales, rhonchi or wheezes  CV: regular rates and rhythm, normal S1 S2, no murmur noted  ABDOMEN:  soft,epigastrium tender, no HSM or masses and bowel sounds normal  PSYCH: mentation appears normal      "

## 2025-08-23 ENCOUNTER — ANCILLARY PROCEDURE (OUTPATIENT)
Dept: GENERAL RADIOLOGY | Facility: CLINIC | Age: 35
End: 2025-08-23
Attending: FAMILY MEDICINE
Payer: COMMERCIAL

## 2025-08-23 ENCOUNTER — OFFICE VISIT (OUTPATIENT)
Dept: URGENT CARE | Facility: URGENT CARE | Age: 35
End: 2025-08-23
Payer: COMMERCIAL

## 2025-08-23 VITALS
HEART RATE: 69 BPM | DIASTOLIC BLOOD PRESSURE: 81 MMHG | SYSTOLIC BLOOD PRESSURE: 114 MMHG | TEMPERATURE: 97 F | WEIGHT: 201 LBS | OXYGEN SATURATION: 99 % | BODY MASS INDEX: 32.44 KG/M2

## 2025-08-23 DIAGNOSIS — M25.512 ACUTE PAIN OF LEFT SHOULDER: Primary | ICD-10-CM

## 2025-08-23 DIAGNOSIS — M25.512 ACUTE PAIN OF LEFT SHOULDER: ICD-10-CM

## 2025-08-23 PROCEDURE — 3079F DIAST BP 80-89 MM HG: CPT | Performed by: FAMILY MEDICINE

## 2025-08-23 PROCEDURE — 73030 X-RAY EXAM OF SHOULDER: CPT | Mod: TC | Performed by: STUDENT IN AN ORGANIZED HEALTH CARE EDUCATION/TRAINING PROGRAM

## 2025-08-23 PROCEDURE — 3074F SYST BP LT 130 MM HG: CPT | Performed by: FAMILY MEDICINE

## 2025-08-23 PROCEDURE — 99214 OFFICE O/P EST MOD 30 MIN: CPT | Performed by: FAMILY MEDICINE

## 2025-08-23 RX ORDER — NAPROXEN 375 MG/1
375 TABLET ORAL 2 TIMES DAILY WITH MEALS
Qty: 30 TABLET | Refills: 0 | Status: SHIPPED | OUTPATIENT
Start: 2025-08-23